# Patient Record
Sex: FEMALE | Race: WHITE | NOT HISPANIC OR LATINO | Employment: OTHER | URBAN - METROPOLITAN AREA
[De-identification: names, ages, dates, MRNs, and addresses within clinical notes are randomized per-mention and may not be internally consistent; named-entity substitution may affect disease eponyms.]

---

## 2022-01-06 ENCOUNTER — OFFICE VISIT (OUTPATIENT)
Dept: RHEUMATOLOGY | Facility: CLINIC | Age: 22
End: 2022-01-06
Payer: COMMERCIAL

## 2022-01-06 ENCOUNTER — HOSPITAL ENCOUNTER (OUTPATIENT)
Dept: RADIOLOGY | Facility: HOSPITAL | Age: 22
Discharge: HOME | End: 2022-01-06
Attending: INTERNAL MEDICINE
Payer: COMMERCIAL

## 2022-01-06 VITALS
SYSTOLIC BLOOD PRESSURE: 112 MMHG | WEIGHT: 135 LBS | DIASTOLIC BLOOD PRESSURE: 69 MMHG | HEIGHT: 65 IN | HEART RATE: 83 BPM | BODY MASS INDEX: 22.49 KG/M2

## 2022-01-06 DIAGNOSIS — M25.50 POLYARTHRALGIA: ICD-10-CM

## 2022-01-06 DIAGNOSIS — M25.50 POLYARTHRALGIA: Primary | ICD-10-CM

## 2022-01-06 PROCEDURE — 72100 XR LUMBAR SPINE AP AND LATERAL: ICD-10-PCS | Mod: 26,,, | Performed by: RADIOLOGY

## 2022-01-06 PROCEDURE — 72202 XR SACROILIAC JOINTS COMPLETE: ICD-10-PCS | Mod: 26,,, | Performed by: RADIOLOGY

## 2022-01-06 PROCEDURE — 99205 PR OFFICE/OUTPT VISIT, NEW, LEVL V, 60-74 MIN: ICD-10-PCS | Mod: S$GLB,,, | Performed by: INTERNAL MEDICINE

## 2022-01-06 PROCEDURE — 72202 X-RAY EXAM SI JOINTS 3/> VWS: CPT | Mod: 26,,, | Performed by: RADIOLOGY

## 2022-01-06 PROCEDURE — 72100 X-RAY EXAM L-S SPINE 2/3 VWS: CPT | Mod: 26,,, | Performed by: RADIOLOGY

## 2022-01-06 PROCEDURE — 99205 OFFICE O/P NEW HI 60 MIN: CPT | Mod: S$GLB,,, | Performed by: INTERNAL MEDICINE

## 2022-01-06 PROCEDURE — 99999 PR PBB SHADOW E&M-NEW PATIENT-LVL III: ICD-10-PCS | Mod: PBBFAC,,, | Performed by: INTERNAL MEDICINE

## 2022-01-06 PROCEDURE — 72100 X-RAY EXAM L-S SPINE 2/3 VWS: CPT | Mod: TC

## 2022-01-06 PROCEDURE — 99999 PR PBB SHADOW E&M-NEW PATIENT-LVL III: CPT | Mod: PBBFAC,,, | Performed by: INTERNAL MEDICINE

## 2022-01-06 PROCEDURE — 72202 X-RAY EXAM SI JOINTS 3/> VWS: CPT | Mod: TC

## 2022-01-06 RX ORDER — MELOXICAM 7.5 MG/1
7.5 TABLET ORAL 2 TIMES DAILY
COMMUNITY

## 2022-01-06 RX ORDER — ZIPRASIDONE HYDROCHLORIDE 40 MG/1
20 CAPSULE ORAL DAILY
COMMUNITY

## 2022-01-06 RX ORDER — HYDROXYCHLOROQUINE SULFATE 200 MG/1
200 TABLET, FILM COATED ORAL 2 TIMES DAILY
COMMUNITY

## 2022-01-06 RX ORDER — LEVONORGESTREL AND ETHINYL ESTRADIOL 0.1-0.02MG
1 KIT ORAL DAILY
COMMUNITY

## 2022-01-06 NOTE — PROGRESS NOTES
Subjective:       Patient ID: Evangelina Cano is a 21 y.o. female.    Chief Complaint: Disease Management    HPI 21 year old F with history RA here for second opinion. She developed right knee pain 3 years ago. Then it went into elbows about 6 months ago.  Denies swelling.  She uses wheelchair because of the knee pain. Prolonged walking or prolonged sitting makes her pain worse.  Pain can be bad enough that she cannot get out of bed. She takes meloxicam 7.5 mg po BID every day and it helps her with her ,pain.  She has been on plaquenil 400mg a day for 2 years. Denies any rashes, oral ulcers, psoriasis.Denies dry eyes or dry mouth. She gets abdominal pain for last 6 months. Reports nauseated after eating.   She gets steroid injections in the knees and last injection was 3 weeks ago. Reports that steroid injection helps with the pain.  She does PT every day. Denies any eye inflammation.  Denies raynauds.   Family hx: negative for psoriasis or RA       Review of Systems   Constitutional: Negative for activity change, appetite change, chills, diaphoresis, fatigue, fever and unexpected weight change.   HENT: Negative for congestion, ear discharge, ear pain, facial swelling, mouth sores, sinus pressure, sneezing, sore throat, tinnitus and trouble swallowing.    Eyes: Negative for photophobia, pain, discharge, redness, itching and visual disturbance.   Respiratory: Negative for apnea, chest tightness, shortness of breath, wheezing and stridor.    Cardiovascular: Negative for leg swelling.   Gastrointestinal: Negative for abdominal distention, abdominal pain, anal bleeding, blood in stool, constipation, diarrhea and nausea.   Endocrine: Negative for cold intolerance and heat intolerance.   Genitourinary: Negative for difficulty urinating and dysuria.   Musculoskeletal: Positive for arthralgias. Negative for back pain, gait problem, joint swelling, myalgias, neck pain and neck stiffness.   Skin: Negative for color change,  "pallor, rash and wound.   Neurological: Negative for dizziness, seizures, light-headedness and numbness.   Hematological: Negative for adenopathy. Does not bruise/bleed easily.   Psychiatric/Behavioral: Negative for sleep disturbance. The patient is not nervous/anxious.             Objective:   /69   Pulse 83   Ht 5' 5" (1.651 m)   Wt 61.2 kg (135 lb)   BMI 22.47 kg/m²      Physical Exam   Constitutional: She is oriented to person, place, and time.   HENT:   Head: Normocephalic and atraumatic.   Right Ear: External ear normal.   Left Ear: External ear normal.   Nose: Nose normal.   Mouth/Throat: Oropharynx is clear and moist. No oropharyngeal exudate.   Eyes: Pupils are equal, round, and reactive to light. Conjunctivae and EOM are normal. Right eye exhibits no discharge. Left eye exhibits no discharge. No scleral icterus.   Neck: No JVD present. No thyromegaly present.   Cardiovascular: Normal rate, regular rhythm, normal heart sounds and intact distal pulses. Exam reveals no gallop and no friction rub.   No murmur heard.  Pulmonary/Chest: Effort normal and breath sounds normal. No respiratory distress. She has no wheezes. She has no rales. She exhibits no tenderness.   Abdominal: Soft. Bowel sounds are normal. She exhibits no distension and no mass. There is no abdominal tenderness. There is no rebound and no guarding.   Musculoskeletal:         General: Tenderness present. No edema. Normal range of motion.      Cervical back: Neck supple.   Lymphadenopathy:     She has no cervical adenopathy.   Neurological: She is alert and oriented to person, place, and time. No cranial nerve deficit. Gait normal. Coordination normal.   Skin: Skin is dry. No rash noted. No erythema. No pallor.   Psychiatric: Affect and judgment normal.        No data to display     Assessment:     21 year old F with history RA here for second opinion. She reports episodes of enthesitis to in elbows and knees to the point she has " difficulty walking.  Based on her symptoms, this is is concerning for a spondyloarthropathy.  No diagnosis found.        Plan:       Problem List Items Addressed This Visit    None       Labs  xrays   Consider MRI of elbow  Can consider Humira     60 * minutes of total time spent on the encounter, which includes face to face time and non-face to face time preparing to see the patient (eg, review of tests), Obtaining and/or reviewing separately obtained history, Documenting clinical information in the electronic or other health record, Independently interpreting results (not separately reported) and communicating results to the patient/family/caregiver, or Care coordination (not separately reported).     *

## 2022-01-10 ENCOUNTER — PATIENT MESSAGE (OUTPATIENT)
Dept: RHEUMATOLOGY | Facility: CLINIC | Age: 22
End: 2022-01-10
Payer: COMMERCIAL

## 2022-01-17 ENCOUNTER — PATIENT MESSAGE (OUTPATIENT)
Dept: RHEUMATOLOGY | Facility: CLINIC | Age: 22
End: 2022-01-17
Payer: COMMERCIAL

## 2022-01-17 RX ORDER — PREDNISONE 20 MG/1
20 TABLET ORAL DAILY
Qty: 10 TABLET | Refills: 0 | Status: SHIPPED | OUTPATIENT
Start: 2022-01-17

## 2022-01-27 ENCOUNTER — PATIENT MESSAGE (OUTPATIENT)
Dept: RHEUMATOLOGY | Facility: CLINIC | Age: 22
End: 2022-01-27

## 2022-01-27 RX ORDER — METHYLPREDNISOLONE 32 MG/1
32 TABLET ORAL DAILY
Qty: 10 TABLET | Refills: 0 | Status: SHIPPED | OUTPATIENT
Start: 2022-01-27 | End: 2022-02-06

## 2022-01-31 ENCOUNTER — PATIENT MESSAGE (OUTPATIENT)
Dept: RHEUMATOLOGY | Facility: CLINIC | Age: 22
End: 2022-01-31
Payer: COMMERCIAL

## 2022-02-04 ENCOUNTER — PATIENT MESSAGE (OUTPATIENT)
Dept: RHEUMATOLOGY | Facility: CLINIC | Age: 22
End: 2022-02-04
Payer: COMMERCIAL

## 2022-02-08 RX ORDER — METHYLPREDNISOLONE 4 MG/1
TABLET ORAL
Qty: 21 EACH | Refills: 0 | Status: SHIPPED | OUTPATIENT
Start: 2022-02-08 | End: 2022-03-01

## 2022-02-19 ENCOUNTER — PATIENT MESSAGE (OUTPATIENT)
Dept: RHEUMATOLOGY | Facility: CLINIC | Age: 22
End: 2022-02-19
Payer: COMMERCIAL

## 2022-03-15 ENCOUNTER — LAB VISIT (OUTPATIENT)
Dept: LAB | Facility: HOSPITAL | Age: 22
End: 2022-03-15
Payer: COMMERCIAL

## 2022-03-15 ENCOUNTER — PATIENT MESSAGE (OUTPATIENT)
Dept: RHEUMATOLOGY | Facility: CLINIC | Age: 22
End: 2022-03-15

## 2022-03-15 ENCOUNTER — OFFICE VISIT (OUTPATIENT)
Dept: RHEUMATOLOGY | Facility: CLINIC | Age: 22
End: 2022-03-15
Payer: COMMERCIAL

## 2022-03-15 VITALS
BODY MASS INDEX: 22.49 KG/M2 | HEIGHT: 65 IN | WEIGHT: 135 LBS | DIASTOLIC BLOOD PRESSURE: 73 MMHG | HEART RATE: 74 BPM | SYSTOLIC BLOOD PRESSURE: 107 MMHG

## 2022-03-15 DIAGNOSIS — M25.50 POLYARTHRALGIA: ICD-10-CM

## 2022-03-15 DIAGNOSIS — M25.50 POLYARTHRALGIA: Primary | ICD-10-CM

## 2022-03-15 LAB
ALBUMIN SERPL BCP-MCNC: 3.9 G/DL (ref 3.5–5.2)
ALP SERPL-CCNC: 46 U/L (ref 55–135)
ALT SERPL W/O P-5'-P-CCNC: 12 U/L (ref 10–44)
ANION GAP SERPL CALC-SCNC: 8 MMOL/L (ref 8–16)
AST SERPL-CCNC: 17 U/L (ref 10–40)
BASOPHILS # BLD AUTO: 0.07 K/UL (ref 0–0.2)
BASOPHILS NFR BLD: 0.9 % (ref 0–1.9)
BILIRUB SERPL-MCNC: 0.4 MG/DL (ref 0.1–1)
BUN SERPL-MCNC: 17 MG/DL (ref 6–20)
CALCIUM SERPL-MCNC: 9.8 MG/DL (ref 8.7–10.5)
CHLORIDE SERPL-SCNC: 105 MMOL/L (ref 95–110)
CO2 SERPL-SCNC: 29 MMOL/L (ref 23–29)
CREAT SERPL-MCNC: 0.8 MG/DL (ref 0.5–1.4)
CRP SERPL-MCNC: 2.6 MG/L (ref 0–8.2)
DIFFERENTIAL METHOD: NORMAL
EOSINOPHIL # BLD AUTO: 0.1 K/UL (ref 0–0.5)
EOSINOPHIL NFR BLD: 1.3 % (ref 0–8)
ERYTHROCYTE [DISTWIDTH] IN BLOOD BY AUTOMATED COUNT: 11.9 % (ref 11.5–14.5)
ERYTHROCYTE [SEDIMENTATION RATE] IN BLOOD BY WESTERGREN METHOD: 8 MM/HR (ref 0–36)
EST. GFR  (AFRICAN AMERICAN): >60 ML/MIN/1.73 M^2
EST. GFR  (NON AFRICAN AMERICAN): >60 ML/MIN/1.73 M^2
GLUCOSE SERPL-MCNC: 91 MG/DL (ref 70–110)
HCT VFR BLD AUTO: 39.6 % (ref 37–48.5)
HGB BLD-MCNC: 13.6 G/DL (ref 12–16)
IMM GRANULOCYTES # BLD AUTO: 0.03 K/UL (ref 0–0.04)
IMM GRANULOCYTES NFR BLD AUTO: 0.4 % (ref 0–0.5)
LYMPHOCYTES # BLD AUTO: 2.6 K/UL (ref 1–4.8)
LYMPHOCYTES NFR BLD: 35.5 % (ref 18–48)
MCH RBC QN AUTO: 30.1 PG (ref 27–31)
MCHC RBC AUTO-ENTMCNC: 34.3 G/DL (ref 32–36)
MCV RBC AUTO: 88 FL (ref 82–98)
MONOCYTES # BLD AUTO: 0.8 K/UL (ref 0.3–1)
MONOCYTES NFR BLD: 10.1 % (ref 4–15)
NEUTROPHILS # BLD AUTO: 3.9 K/UL (ref 1.8–7.7)
NEUTROPHILS NFR BLD: 51.8 % (ref 38–73)
NRBC BLD-RTO: 0 /100 WBC
PLATELET # BLD AUTO: 292 K/UL (ref 150–450)
PMV BLD AUTO: 10 FL (ref 9.2–12.9)
POTASSIUM SERPL-SCNC: 3.9 MMOL/L (ref 3.5–5.1)
PROT SERPL-MCNC: 7 G/DL (ref 6–8.4)
RBC # BLD AUTO: 4.52 M/UL (ref 4–5.4)
SODIUM SERPL-SCNC: 142 MMOL/L (ref 136–145)
WBC # BLD AUTO: 7.44 K/UL (ref 3.9–12.7)

## 2022-03-15 PROCEDURE — 99214 PR OFFICE/OUTPT VISIT, EST, LEVL IV, 30-39 MIN: ICD-10-PCS | Mod: 25,S$GLB,,

## 2022-03-15 PROCEDURE — 86140 C-REACTIVE PROTEIN: CPT

## 2022-03-15 PROCEDURE — 99999 PR PBB SHADOW E&M-EST. PATIENT-LVL III: ICD-10-PCS | Mod: PBBFAC,,,

## 2022-03-15 PROCEDURE — 99999 PR PBB SHADOW E&M-EST. PATIENT-LVL III: CPT | Mod: PBBFAC,,,

## 2022-03-15 PROCEDURE — 80053 COMPREHEN METABOLIC PANEL: CPT

## 2022-03-15 PROCEDURE — 36415 COLL VENOUS BLD VENIPUNCTURE: CPT

## 2022-03-15 PROCEDURE — 85652 RBC SED RATE AUTOMATED: CPT

## 2022-03-15 PROCEDURE — 99214 OFFICE O/P EST MOD 30 MIN: CPT | Mod: 25,S$GLB,,

## 2022-03-15 PROCEDURE — 96372 PR INJECTION,THERAP/PROPH/DIAG2ST, IM OR SUBCUT: ICD-10-PCS | Mod: S$GLB,,, | Performed by: INTERNAL MEDICINE

## 2022-03-15 PROCEDURE — 85025 COMPLETE CBC W/AUTO DIFF WBC: CPT

## 2022-03-15 PROCEDURE — 96372 THER/PROPH/DIAG INJ SC/IM: CPT | Mod: S$GLB,,, | Performed by: INTERNAL MEDICINE

## 2022-03-15 RX ORDER — KETOROLAC TROMETHAMINE 30 MG/ML
60 INJECTION, SOLUTION INTRAMUSCULAR; INTRAVENOUS
Status: COMPLETED | OUTPATIENT
Start: 2022-03-15 | End: 2022-03-15

## 2022-03-15 RX ADMIN — KETOROLAC TROMETHAMINE 60 MG: 30 INJECTION, SOLUTION INTRAMUSCULAR; INTRAVENOUS at 02:03

## 2022-03-15 ASSESSMENT — ROUTINE ASSESSMENT OF PATIENT INDEX DATA (RAPID3)
AM STIFFNESS SCORE: 1, YES
PATIENT GLOBAL ASSESSMENT SCORE: 10
WHEN YOU AWAKENED IN THE MORNING OVER THE LAST WEEK, PLEASE INDICATE THE AMOUNT OF TIME IT TAKES UNTIL YOU ARE AS LIMBER AS YOU WILL BE FOR THE DAY: 2 HOURS
TOTAL RAPID3 SCORE: 7
MDHAQ FUNCTION SCORE: 1.2
PSYCHOLOGICAL DISTRESS SCORE: 3.3
FATIGUE SCORE: 0
PAIN SCORE: 7

## 2022-03-15 NOTE — PROGRESS NOTES
Subjective:       Patient ID: Evangelina Cano is a 22 y.o. female.    Chief Complaint: Disease Management    HPI   22 year old F, previously seen Dr. Baldwin for 2nd opinion for RA. Hx of right knee pain for 2-3 years progressing to elbows     [place] 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) Bipolar/anxiety/depression  PSH: gallbladder  FH: Mother with bechets, uncle with lymphoma  SH: smokes cannabis once every 2 weeks, drinks socially  Allergies: pen(hives)      Widespread pain index   Note the areas which the patient has had pain over the last week:                    Shoulder-girdle, left 0               Shoulder-girdle, right 1                         Upper arm left 0                       Upper arm right 1                         Lower arm left 1                       Lower arm right 1    Hip (buttock, trochanter) left 0  Hip (buttock, trochanter) right 0                          Upper leg, left 1                        Upper leg, right 1                          Lower leg, left 1                        Lower leg, right 1                                   Jaw, left 0                                 Jaw, right 0                                       Chest 0                                 Abdomen 0                              Upper back 0                              Lower back 0                                        Neck 0  Score will be from 0-19: 8                                         Symptom severity score   Fatigue 0  Waking Unrefreshed 0  Cognitive Symptoms 0   0 = no problem, 1=slight or mild problem 2= moderate; considerable problems often present and/or at a moderate level, 3 = severe, pervasive, continuous, life disturbing problem   For each of the 3 symptoms, indicate  "the level of severity over the past week using the Scale.  The symptom severity score is the sum of the severity of the 3 symptoms (fatigue, waking unrefreshed, and cognitive symptoms) plus the number of the following symptoms occurring during the previous 6 months:   Headaches 1  Pain or cramps in the lower abdomen 1  Depression 1  The final score is between 0 and 12: 3                                          Criteria   Patient has fibromyalgia if the following 3 conditions are met:   1.  Widespread pain index greater than or equal to 7 and symptom severity score greater than or equal to 5 or widespread pain index between 3- 6, and symptom severity score greater than or equal to 9.     2.  Symptoms have been present in a similar level for at least 3 months   3.  The patient does not have a disorder that would otherwise sufficiently explain the pain         Initial HPI per Dr. Fontana:  "21 year old F with history RA here for second opinion. She developed right knee pain 3 years ago. Then it went into elbows about 6 months ago.  Denies swelling.  She uses wheelchair because of the knee pain. Prolonged walking or prolonged sitting makes her pain worse.  Pain can be bad enough that she cannot get out of bed. She takes meloxicam 7.5 mg po BID every day and it helps her with her ,pain.  She has been on plaquenil 400mg a day for 2 years. Denies any rashes, oral ulcers, psoriasis.Denies dry eyes or dry mouth. She gets abdominal pain for last 6 months. Reports nauseated after eating.   She gets steroid injections in the knees and last injection was 3 weeks ago. Reports that steroid injection helps with the pain.  She does PT every day. Denies any eye inflammation.  Denies raynauds.   Family hx: negative for psoriasis or RA"           Review of Systems   Constitutional: Negative for fever and unexpected weight change.   HENT: Negative for mouth sores and trouble swallowing.    Respiratory: Negative for cough and shortness " "of breath.    Cardiovascular: Negative for chest pain.   Gastrointestinal: Negative for constipation and diarrhea.   Genitourinary: Negative for dysuria and genital sores.   Musculoskeletal: Positive for arthralgias and myalgias.   Skin: Negative for rash.   Neurological: Positive for weakness. Negative for headaches.   Hematological: Does not bruise/bleed easily.   Psychiatric/Behavioral: The patient is nervous/anxious.          Objective:   /73   Pulse 74   Ht 5' 5" (1.651 m)   Wt 61.2 kg (135 lb)   BMI 22.47 kg/m²      Physical Exam   Constitutional: She is oriented to person, place, and time. She is cooperative. No distress.   HENT:   Head: Normocephalic and atraumatic.   Mouth/Throat: Mucous membranes are moist. No oropharyngeal exudate.   Eyes: Pupils are equal, round, and reactive to light.   Cardiovascular: Normal rate and normal pulses.   Pulmonary/Chest: Effort normal. No respiratory distress. She has no wheezes.   Abdominal: Normal appearance.   Musculoskeletal:         General: No swelling or tenderness. Normal range of motion.   Neurological: She is alert and oriented to person, place, and time. She displays no weakness. No cranial nerve deficit or sensory deficit.   Skin: Skin is warm and dry. Capillary refill takes less than 2 seconds. No jaundice.   Psychiatric: Her speech is normal. Mood and thought content normal.           Assessment:       1. Polyarthralgia            Plan:       Problem List Items Addressed This Visit    None     Visit Diagnoses     Polyarthralgia    -  Primary    Relevant Medications    ketorolac injection 60 mg (Completed)    Other Relevant Orders    Comprehensive Metabolic Panel (Completed)    CBC Auto Differential (Completed)    C-Reactive Protein (Completed)    Sedimentation rate (Completed)    NM Joint Scan Whole Body              22 year old CF here for 3rd opinion for complaints of diffuse pain around her joints; she has had migratory joint pains starting from " the knees, elbows, and wrists. Patient has been taking HCQ and Mobic without relief. States that prednisone and medrol worked really well for her. Reviewed workup from [place] [facility].       Work up:  CBC wnl  CMP wnl except low ALP  Negative GADIEL, RF, CCP, and HLA B27  TB and Hep negative  Xray lumbar and SI negative      Assessment/Plan:  She was tearful in the room due to the pain she is in and wanting to be on a long term medication. I advised her that we don't have a dx yet and to treat with immunosuppression could cause more harm than good. Unable to find objective evidence of inflammation at this time. Advised her that we will take this one step at a time. Can get a NM joint bone scan to try and find evidence of synovitis. Offered Toradol shot today which she accepted.     In the meantime will get labs today. Would recommend conservative therapy initially for fibromyalgia which she meets criteria for. Recommend good control of stress, sleep, diet, and exercise.     Case discussed with Dr. Shaw  Will plan to bring her back in 3-4 weeks

## 2022-03-15 NOTE — PROGRESS NOTES
Rapid3 Question Responses and Scores 3/15/2022   MDHAQ Score 1.2   Psychologic Score 3.3   Pain Score 7   When you awakened in the morning OVER THE LAST WEEK, did you feel stiff? Yes   If Yes, please indicate the number of hours until you are as limber as you will be for the day 2   Fatigue Score 0   Global Health Score 10   RAPID3 Score 7

## 2022-03-17 NOTE — PROGRESS NOTES
I have personally taken the history and examined the patient to verify the fellow's notation, and I agree with the impression and plan stated.      Dr De Souza and I saw the patient this a.m. to provide third rheumatology assessment. She gives a hx of joint pain that is improved with NSAID and steroid and possibly hydroxychloroquine but is still so intense that she is unable to function normally. Previous evaluations have failed to identify objective evidence of inflammation, and physical examination today is notable for tenderness in the absence of swelling or deformity.     I explained that we will perform additional studies to look for inflammation in an effort to make a diagnosis and clarify appropriate treatment. I noted that her pain is far out of proportion to her objective findings and that there must be a pain amplification syndrome present for her to be this uncomfortable. This discussion led to a tearful panic attack by the patient. Dr De Souza was able to talk her through the panic attack and reassure her that we will do all we can to diagnose and treat her problem and help her. Her father was present in the exam room during the encounter.     We will repeat labs as previous lab may have been taken after corticosteroids. We will obtain a bone scan to look for synovitis or spondylarthritis.

## 2022-03-22 ENCOUNTER — HOSPITAL ENCOUNTER (OUTPATIENT)
Dept: RADIOLOGY | Facility: HOSPITAL | Age: 22
Discharge: HOME | End: 2022-03-22
Payer: COMMERCIAL

## 2022-03-22 DIAGNOSIS — M25.50 POLYARTHRALGIA: ICD-10-CM

## 2022-03-22 PROCEDURE — 78306 NM JOINT SCAN WHOLE BODY: ICD-10-PCS | Mod: 26,,, | Performed by: RADIOLOGY

## 2022-03-22 PROCEDURE — 78306 BONE IMAGING WHOLE BODY: CPT | Mod: 26,,, | Performed by: RADIOLOGY

## 2022-03-22 PROCEDURE — 78306 BONE IMAGING WHOLE BODY: CPT | Mod: TC

## 2022-03-30 ENCOUNTER — PATIENT MESSAGE (OUTPATIENT)
Dept: RHEUMATOLOGY | Facility: CLINIC | Age: 22
End: 2022-03-30
Payer: COMMERCIAL

## 2022-04-05 ENCOUNTER — PATIENT MESSAGE (OUTPATIENT)
Dept: RHEUMATOLOGY | Facility: CLINIC | Age: 22
End: 2022-04-05
Payer: COMMERCIAL

## 2022-04-27 ENCOUNTER — PATIENT MESSAGE (OUTPATIENT)
Dept: RHEUMATOLOGY | Facility: CLINIC | Age: 22
End: 2022-04-27
Payer: COMMERCIAL

## 2022-05-23 ENCOUNTER — PATIENT MESSAGE (OUTPATIENT)
Dept: RHEUMATOLOGY | Facility: CLINIC | Age: 22
End: 2022-05-23
Payer: COMMERCIAL

## 2022-06-20 ENCOUNTER — PATIENT MESSAGE (OUTPATIENT)
Dept: RHEUMATOLOGY | Facility: CLINIC | Age: 22
End: 2022-06-20
Payer: COMMERCIAL

## 2022-06-28 ENCOUNTER — OFFICE VISIT (OUTPATIENT)
Dept: RHEUMATOLOGY | Facility: CLINIC | Age: 22
End: 2022-06-28
Payer: COMMERCIAL

## 2022-06-28 VITALS
BODY MASS INDEX: 21.68 KG/M2 | SYSTOLIC BLOOD PRESSURE: 127 MMHG | DIASTOLIC BLOOD PRESSURE: 81 MMHG | WEIGHT: 130.31 LBS | HEART RATE: 68 BPM

## 2022-06-28 DIAGNOSIS — M79.7 FIBROMYALGIA: ICD-10-CM

## 2022-06-28 DIAGNOSIS — M25.50 POLYARTHRALGIA: Primary | ICD-10-CM

## 2022-06-28 PROCEDURE — 99999 PR PBB SHADOW E&M-EST. PATIENT-LVL III: CPT | Mod: PBBFAC,,,

## 2022-06-28 PROCEDURE — 99999 PR PBB SHADOW E&M-EST. PATIENT-LVL III: ICD-10-PCS | Mod: PBBFAC,,,

## 2022-06-28 PROCEDURE — 99214 PR OFFICE/OUTPT VISIT, EST, LEVL IV, 30-39 MIN: ICD-10-PCS | Mod: S$GLB,,,

## 2022-06-28 PROCEDURE — 99214 OFFICE O/P EST MOD 30 MIN: CPT | Mod: S$GLB,,,

## 2022-06-28 NOTE — PROGRESS NOTES
Subjective:       Patient ID: Evangelina Cano is a 22 y.o. female.    Chief Complaint: No chief complaint on file.    HPI   22 year old F, previously seen Dr. Baldwin for 2nd opinion for RA. Hx of right knee pain for 2-3 years progressing to elbows     [place]. 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) Bipolar/anxiety/depression  PSH: gallbladder  FH: Mother with bechets, uncle with lymphoma  SH: smokes cannabis once every 2 weeks, drinks socially  Allergies: pen(hives)      Widespread pain index   Note the areas which the patient has had pain over the last week:                    Shoulder-girdle, left 0               Shoulder-girdle, right 1                         Upper arm left 0                       Upper arm right 1                         Lower arm left 1                       Lower arm right 1    Hip (buttock, trochanter) left 0  Hip (buttock, trochanter) right 0                          Upper leg, left 1                        Upper leg, right 1                          Lower leg, left 1                        Lower leg, right 1                                   Jaw, left 0                                 Jaw, right 0                                       Chest 0                                 Abdomen 0                              Upper back 0                              Lower back 0                                        Neck 0  Score will be from 0-19: 8                                         Symptom severity score   Fatigue 0  Waking Unrefreshed 0  Cognitive Symptoms 0   0 = no problem, 1=slight or mild problem 2= moderate; considerable problems often present and/or at a moderate level, 3 = severe, pervasive, continuous, life disturbing problem   For each of the 3 symptoms,  "indicate the level of severity over the past week using the Scale.  The symptom severity score is the sum of the severity of the 3 symptoms (fatigue, waking unrefreshed, and cognitive symptoms) plus the number of the following symptoms occurring during the previous 6 months:   Headaches 1  Pain or cramps in the lower abdomen 1  Depression 1  The final score is between 0 and 12: 3                                          Criteria   Patient has fibromyalgia if the following 3 conditions are met:   1.  Widespread pain index greater than or equal to 7 and symptom severity score greater than or equal to 5 or widespread pain index between 3- 6, and symptom severity score greater than or equal to 9.     2.  Symptoms have been present in a similar level for at least 3 months   3.  The patient does not have a disorder that would otherwise sufficiently explain the pain       Initial HPI per Dr. Fontana:  "21 year old F with history RA here for second opinion. She developed right knee pain 3 years ago. Then it went into elbows about 6 months ago.  Denies swelling.  She uses wheelchair because of the knee pain. Prolonged walking or prolonged sitting makes her pain worse.  Pain can be bad enough that she cannot get out of bed. She takes meloxicam 7.5 mg po BID every day and it helps her with her ,pain.  She has been on plaquenil 400mg a day for 2 years. Denies any rashes, oral ulcers, psoriasis.Denies dry eyes or dry mouth. She gets abdominal pain for last 6 months. Reports nauseated after eating.   She gets steroid injections in the knees and last injection was 3 weeks ago. Reports that steroid injection helps with the pain.  She does PT every day. Denies any eye inflammation.  Denies raynauds.   Family hx: negative for psoriasis or RA"     Interval History:  6/28/22:  Taking naltrexone 3mg and has been working well for her but she feels she needs to go up on the dose; she has been trying the diet but does not tolerate it well " intermittently; she has a GI appointment coming up;       Review of Systems   Constitutional: Negative for fever and unexpected weight change.   HENT: Negative for mouth sores and trouble swallowing.    Respiratory: Negative for cough and shortness of breath.    Cardiovascular: Negative for chest pain.   Gastrointestinal: Negative for constipation and diarrhea.   Genitourinary: Negative for dysuria and genital sores.   Musculoskeletal: Positive for arthralgias and myalgias.   Skin: Negative for rash.   Neurological: Positive for weakness. Negative for headaches.   Hematological: Does not bruise/bleed easily.   Psychiatric/Behavioral: The patient is nervous/anxious.          Objective:   /81   Pulse 68   Wt 59.1 kg (130 lb 4.7 oz)   BMI 21.68 kg/m²      Physical Exam   Constitutional: She is oriented to person, place, and time. She is cooperative. No distress.   HENT:   Head: Normocephalic and atraumatic.   Mouth/Throat: Mucous membranes are moist. No oropharyngeal exudate.   Eyes: Pupils are equal, round, and reactive to light.   Cardiovascular: Normal rate and normal pulses.   Pulmonary/Chest: Effort normal. No respiratory distress. She has no wheezes.   Abdominal: Normal appearance.   Musculoskeletal:         General: No swelling or tenderness. Normal range of motion.   Neurological: She is alert and oriented to person, place, and time. She displays no weakness. No cranial nerve deficit or sensory deficit.   Skin: Skin is warm and dry. Capillary refill takes less than 2 seconds. No jaundice.   Psychiatric: Her speech is normal. Mood and thought content normal.           Assessment:       1. Polyarthralgia    2. Fibromyalgia            Plan:       Problem List Items Addressed This Visit    None     Visit Diagnoses     Polyarthralgia    -  Primary    Relevant Orders    Ambulatory referral/consult to Physical/Occupational Therapy    Fibromyalgia        Relevant Orders    Ambulatory referral/consult to  Physical/Occupational Therapy              22 year old CF here for 3rd opinion for complaints of diffuse pain around her joints; she has had migratory joint pains starting from the knees, elbows, and wrists. Patient has been taking HCQ and Mobic without relief. States that prednisone and medrol worked really well for her. Reviewed workup from [facility].       Work up:  CBC wnl  CMP wnl except low ALP  SED and CRP negative  Negative GADIEL, RF, CCP, and HLA B27  TB and Hep negative  Xray lumbar and SI negative  NM Bone scan: negative for any active inflammatory process      Assessment/Plan:  Fibromyalgia      Recommend good control of stress, sleep, diet, and exercise.   Continue low dose naltrexone with her pain doctor  Educated on fibromyalgia and different aspects to maintain good health; she has been doing Ramon Chi     Case discussed with Dr. Shaw  RTC 3 months

## 2022-06-30 NOTE — PROGRESS NOTES
I have personally taken the history and examined the patient to verify the fellow's notation, and I agree with the impression and plan stated.      Hx and PE mostly suggest fibromyalgia, not seronegative RA. If the latter was previously present it may be controlled with HCQ   Fortunately her FMS sx have improved on naltrexone.    WD

## 2022-08-04 ENCOUNTER — PATIENT MESSAGE (OUTPATIENT)
Dept: RHEUMATOLOGY | Facility: CLINIC | Age: 22
End: 2022-08-04
Payer: COMMERCIAL

## 2022-08-17 ENCOUNTER — PATIENT MESSAGE (OUTPATIENT)
Dept: RHEUMATOLOGY | Facility: CLINIC | Age: 22
End: 2022-08-17
Payer: COMMERCIAL

## 2022-08-25 ENCOUNTER — PATIENT MESSAGE (OUTPATIENT)
Dept: RHEUMATOLOGY | Facility: CLINIC | Age: 22
End: 2022-08-25
Payer: COMMERCIAL

## 2022-09-04 ENCOUNTER — PATIENT MESSAGE (OUTPATIENT)
Dept: RHEUMATOLOGY | Facility: CLINIC | Age: 22
End: 2022-09-04
Payer: COMMERCIAL

## 2022-09-07 ENCOUNTER — PATIENT MESSAGE (OUTPATIENT)
Dept: RHEUMATOLOGY | Facility: CLINIC | Age: 22
End: 2022-09-07
Payer: COMMERCIAL

## 2022-09-23 ENCOUNTER — PATIENT MESSAGE (OUTPATIENT)
Dept: RHEUMATOLOGY | Facility: CLINIC | Age: 22
End: 2022-09-23
Payer: COMMERCIAL

## 2022-10-03 ENCOUNTER — PATIENT MESSAGE (OUTPATIENT)
Dept: RHEUMATOLOGY | Facility: CLINIC | Age: 22
End: 2022-10-03
Payer: COMMERCIAL